# Patient Record
Sex: FEMALE | Race: OTHER | HISPANIC OR LATINO
[De-identification: names, ages, dates, MRNs, and addresses within clinical notes are randomized per-mention and may not be internally consistent; named-entity substitution may affect disease eponyms.]

---

## 2019-07-31 PROBLEM — Z00.00 ENCOUNTER FOR PREVENTIVE HEALTH EXAMINATION: Status: ACTIVE | Noted: 2019-07-31

## 2019-08-07 ENCOUNTER — APPOINTMENT (OUTPATIENT)
Age: 54
End: 2019-08-07
Payer: SELF-PAY

## 2019-08-07 VITALS — TEMPERATURE: 98.9 F | HEIGHT: 60 IN | BODY MASS INDEX: 47.32 KG/M2 | WEIGHT: 241 LBS

## 2019-08-07 VITALS — DIASTOLIC BLOOD PRESSURE: 87 MMHG | HEART RATE: 82 BPM | SYSTOLIC BLOOD PRESSURE: 118 MMHG | OXYGEN SATURATION: 92 %

## 2019-08-07 DIAGNOSIS — Z78.9 OTHER SPECIFIED HEALTH STATUS: ICD-10-CM

## 2019-08-07 DIAGNOSIS — E66.01 MORBID (SEVERE) OBESITY DUE TO EXCESS CALORIES: ICD-10-CM

## 2019-08-07 DIAGNOSIS — Z86.69 PERSONAL HISTORY OF OTHER DISEASES OF THE NERVOUS SYSTEM AND SENSE ORGANS: ICD-10-CM

## 2019-08-07 DIAGNOSIS — Z86.59 PERSONAL HISTORY OF OTHER MENTAL AND BEHAVIORAL DISORDERS: ICD-10-CM

## 2019-08-07 DIAGNOSIS — Z83.3 FAMILY HISTORY OF DIABETES MELLITUS: ICD-10-CM

## 2019-08-07 DIAGNOSIS — G47.30 SLEEP APNEA, UNSPECIFIED: ICD-10-CM

## 2019-08-07 PROCEDURE — 99203 OFFICE O/P NEW LOW 30 MIN: CPT

## 2019-08-07 NOTE — REVIEW OF SYSTEMS
[Patient Intake Form Reviewed] : Patient intake form was reviewed [Fatigue] : fatigue [Recent Change In Weight] : ~T recent weight change [SOB on Exertion] : shortness of breath during exertion [Negative] : Allergic/Immunologic [Fever] : no fever [Chills] : no chills [Night Sweats] : no night sweats [Vision Problems] : no vision problems [Dysphagia] : no dysphagia [Odynophagia] : no odynophagia [Chest Pain] : no chest pain [Palpitations] : no palpitations [Shortness Of Breath] : no shortness of breath [Wheezing] : no wheezing [Cough] : no cough [Vomiting] : no vomiting [Abdominal Pain] : no abdominal pain [Reflux/Heartburn] : no reflux/ heartburn [Hernia] : no hernia [Dysuria] : no dysuria [Incontinence] : no incontinence [Joint Pain] : no joint pain [Muscle Pain] : no muscle pain [Muscle Weakness] : no muscle weakness [Easy Bleeding] : no tendency for easy bleeding [Easy Bruising] : no tendency for easy bruising

## 2019-08-07 NOTE — HISTORY OF PRESENT ILLNESS
[de-identified] : Patient presents for bariatric surgery consultation. Patient has a long-standing history of severe obesity refractory to multiple prior attempts at weight loss including conservative treatments of diet and exercise. Patient has also developed increased risk or worsening obesity-related co-morbidities, including sleep apnea.  \par \par Patient has been obese since her 30's, when she had her first child.  The most weight that patient has been able to lose by any means is negligible and short-lived . \par The patient has tried several weight loss programs including self-directed and physician or dietician-supervised diets.  Patient has tried prescription weight loss medications and commercial diets.  Patient has limited capacity for exercise due to excess weight.  \par \par Patient feels that weight loss surgery is the only option at this point for effective and clinically meaningful weight loss.\par \par \par

## 2019-08-07 NOTE — ASSESSMENT
[FreeTextEntry1] : Clinical Impression\par Patient is a 54 year old female with a BMI of 46 which places patient in the morbidly obese category.  Patient also suffers from sleep apnea .  This has directly contributed to patient's  current medical conditions as well as, a decreased quality of life.  Patient has very little chance of successfully losing and maintaining a significant amount of weight with non-surgical management, and would benefit from surgical intervention.  Patient meets the criteria for weight loss surgery as defined in the NIH consensus statement, surgery is medically necessary. \par Given patient’s current BMI and obesity-related comorbidities, I feel the patient is a candidate for and would benefit from weight loss surgery, as all prior attempts by non-surgical means have been futile.\par

## 2019-08-07 NOTE — PHYSICAL EXAM
[Obese, well nourished, in no acute distress] : obese, well nourished, in no acute distress [Normal] : affect appropriate [de-identified] : Obese, protuberant. Soft, nontender, nondistended, no rebound or guarding.  Well-healed incisions from prior lap tito, no hernias appreciated.

## 2019-08-07 NOTE — CONSULT LETTER
[Dear  ___] : Dear  [unfilled], [Consult Letter:] : I had the pleasure of evaluating your patient, [unfilled]. [Please see my note below.] : Please see my note below. [Sincerely,] : Sincerely, [Consult Closing:] : Thank you very much for allowing me to participate in the care of this patient.  If you have any questions, please do not hesitate to contact me. [FreeTextEntry3] : Tushar

## 2019-08-21 ENCOUNTER — TRANSCRIPTION ENCOUNTER (OUTPATIENT)
Age: 54
End: 2019-08-21

## 2019-09-11 ENCOUNTER — OTHER (OUTPATIENT)
Age: 54
End: 2019-09-11

## 2019-12-13 ENCOUNTER — EMERGENCY (EMERGENCY)
Facility: HOSPITAL | Age: 54
LOS: 1 days | Discharge: HOME | End: 2019-12-13
Attending: EMERGENCY MEDICINE
Payer: COMMERCIAL

## 2019-12-13 VITALS
DIASTOLIC BLOOD PRESSURE: 80 MMHG | OXYGEN SATURATION: 98 % | HEART RATE: 78 BPM | RESPIRATION RATE: 17 BRPM | WEIGHT: 225.09 LBS | SYSTOLIC BLOOD PRESSURE: 140 MMHG | TEMPERATURE: 99 F

## 2019-12-13 VITALS
TEMPERATURE: 98 F | OXYGEN SATURATION: 98 % | HEART RATE: 66 BPM | DIASTOLIC BLOOD PRESSURE: 49 MMHG | RESPIRATION RATE: 17 BRPM | SYSTOLIC BLOOD PRESSURE: 123 MMHG

## 2019-12-13 DIAGNOSIS — Z98.890 OTHER SPECIFIED POSTPROCEDURAL STATES: Chronic | ICD-10-CM

## 2019-12-13 DIAGNOSIS — Z90.49 ACQUIRED ABSENCE OF OTHER SPECIFIED PARTS OF DIGESTIVE TRACT: Chronic | ICD-10-CM

## 2019-12-13 LAB
ANION GAP SERPL CALC-SCNC: 5 MMOL/L — SIGNIFICANT CHANGE UP (ref 5–17)
BUN SERPL-MCNC: 7 MG/DL — SIGNIFICANT CHANGE UP (ref 7–18)
CALCIUM SERPL-MCNC: 9 MG/DL — SIGNIFICANT CHANGE UP (ref 8.4–10.5)
CHLORIDE SERPL-SCNC: 111 MMOL/L — HIGH (ref 96–108)
CO2 SERPL-SCNC: 25 MMOL/L — SIGNIFICANT CHANGE UP (ref 22–31)
CREAT SERPL-MCNC: 0.61 MG/DL — SIGNIFICANT CHANGE UP (ref 0.5–1.3)
GLUCOSE SERPL-MCNC: 104 MG/DL — HIGH (ref 70–99)
HCT VFR BLD CALC: 37.3 % — SIGNIFICANT CHANGE UP (ref 34.5–45)
HGB BLD-MCNC: 12.6 G/DL — SIGNIFICANT CHANGE UP (ref 11.5–15.5)
MCHC RBC-ENTMCNC: 32.1 PG — SIGNIFICANT CHANGE UP (ref 27–34)
MCHC RBC-ENTMCNC: 33.8 GM/DL — SIGNIFICANT CHANGE UP (ref 32–36)
MCV RBC AUTO: 95.2 FL — SIGNIFICANT CHANGE UP (ref 80–100)
NRBC # BLD: 0 /100 WBCS — SIGNIFICANT CHANGE UP (ref 0–0)
PLATELET # BLD AUTO: 227 K/UL — SIGNIFICANT CHANGE UP (ref 150–400)
POTASSIUM SERPL-MCNC: 3.6 MMOL/L — SIGNIFICANT CHANGE UP (ref 3.5–5.3)
POTASSIUM SERPL-SCNC: 3.6 MMOL/L — SIGNIFICANT CHANGE UP (ref 3.5–5.3)
RBC # BLD: 3.92 M/UL — SIGNIFICANT CHANGE UP (ref 3.8–5.2)
RBC # FLD: 11.8 % — SIGNIFICANT CHANGE UP (ref 10.3–14.5)
SODIUM SERPL-SCNC: 141 MMOL/L — SIGNIFICANT CHANGE UP (ref 135–145)
WBC # BLD: 5.94 K/UL — SIGNIFICANT CHANGE UP (ref 3.8–10.5)
WBC # FLD AUTO: 5.94 K/UL — SIGNIFICANT CHANGE UP (ref 3.8–10.5)

## 2019-12-13 PROCEDURE — 72170 X-RAY EXAM OF PELVIS: CPT | Mod: 26

## 2019-12-13 PROCEDURE — 72192 CT PELVIS W/O DYE: CPT | Mod: 26

## 2019-12-13 PROCEDURE — 99285 EMERGENCY DEPT VISIT HI MDM: CPT

## 2019-12-13 PROCEDURE — 96374 THER/PROPH/DIAG INJ IV PUSH: CPT

## 2019-12-13 PROCEDURE — 80048 BASIC METABOLIC PNL TOTAL CA: CPT

## 2019-12-13 PROCEDURE — 72192 CT PELVIS W/O DYE: CPT

## 2019-12-13 PROCEDURE — 73502 X-RAY EXAM HIP UNI 2-3 VIEWS: CPT | Mod: 26,RT

## 2019-12-13 PROCEDURE — 85027 COMPLETE CBC AUTOMATED: CPT

## 2019-12-13 PROCEDURE — 99284 EMERGENCY DEPT VISIT MOD MDM: CPT | Mod: 25

## 2019-12-13 PROCEDURE — 72170 X-RAY EXAM OF PELVIS: CPT

## 2019-12-13 PROCEDURE — 72131 CT LUMBAR SPINE W/O DYE: CPT

## 2019-12-13 PROCEDURE — 96375 TX/PRO/DX INJ NEW DRUG ADDON: CPT

## 2019-12-13 PROCEDURE — 73502 X-RAY EXAM HIP UNI 2-3 VIEWS: CPT

## 2019-12-13 PROCEDURE — 36415 COLL VENOUS BLD VENIPUNCTURE: CPT

## 2019-12-13 PROCEDURE — 99053 MED SERV 10PM-8AM 24 HR FAC: CPT

## 2019-12-13 PROCEDURE — 72131 CT LUMBAR SPINE W/O DYE: CPT | Mod: 26

## 2019-12-13 RX ORDER — ACETAMINOPHEN 500 MG
650 TABLET ORAL ONCE
Refills: 0 | Status: COMPLETED | OUTPATIENT
Start: 2019-12-13 | End: 2019-12-13

## 2019-12-13 RX ORDER — KETOROLAC TROMETHAMINE 30 MG/ML
15 SYRINGE (ML) INJECTION ONCE
Refills: 0 | Status: DISCONTINUED | OUTPATIENT
Start: 2019-12-13 | End: 2019-12-13

## 2019-12-13 RX ORDER — MORPHINE SULFATE 50 MG/1
4 CAPSULE, EXTENDED RELEASE ORAL ONCE
Refills: 0 | Status: DISCONTINUED | OUTPATIENT
Start: 2019-12-13 | End: 2019-12-13

## 2019-12-13 RX ORDER — CYCLOBENZAPRINE HYDROCHLORIDE 10 MG/1
1 TABLET, FILM COATED ORAL
Qty: 15 | Refills: 0
Start: 2019-12-13 | End: 2019-12-17

## 2019-12-13 RX ORDER — DIAZEPAM 5 MG
2 TABLET ORAL ONCE
Refills: 0 | Status: DISCONTINUED | OUTPATIENT
Start: 2019-12-13 | End: 2019-12-13

## 2019-12-13 RX ADMIN — Medication 650 MILLIGRAM(S): at 09:57

## 2019-12-13 RX ADMIN — Medication 2 MILLIGRAM(S): at 08:09

## 2019-12-13 RX ADMIN — MORPHINE SULFATE 4 MILLIGRAM(S): 50 CAPSULE, EXTENDED RELEASE ORAL at 11:54

## 2019-12-13 RX ADMIN — Medication 15 MILLIGRAM(S): at 08:09

## 2019-12-13 RX ADMIN — Medication 650 MILLIGRAM(S): at 10:30

## 2019-12-13 RX ADMIN — Medication 15 MILLIGRAM(S): at 10:30

## 2019-12-13 NOTE — ED PROVIDER NOTE - CLINICAL SUMMARY MEDICAL DECISION MAKING FREE TEXT BOX
53 yo F with right reproducible right gluteal pain. Likely msk. Will provided analgesia and reassess.

## 2019-12-13 NOTE — ED PROVIDER NOTE - NS ED ROS FT
ROS  GENERAL: no fevers, no chills  EYES: no visual changes, no blurry vision    ENT: no epistaxis,  no throat pain  CHEST: no pain with breathing,  no hemoptysis   CARDIAC: no chest pain, no upper back pain   GI: no abdominal pain, no hematemesis, no bright red blood per rectum   : no dysuria, no hematuria   MSK: no arm pain, no back pain, right leg and gluteal pain   SKIN: no rash   NEURO: no headache, no neck pain   HEME: no easy bruising, no easy bleeding

## 2019-12-13 NOTE — ED PROVIDER NOTE - PHYSICAL EXAMINATION
GEN:   comfortable, in no apparent distress, AOx3, obese   EYES:   PERRL, extra-occular movements intact  HEENT:   airway patent, moist mucosal membranes, uvula midline  CV:   regular rate and rhythm, normal S1/S2, no murmur  RESP:   clear to auscultation bilaterally, non-labored, speaking in full sentences  ABD:   soft, non tender, no guarding  :   no cva tenderness  MSK:   5/5 strength, moving all extremity, reproducible right gluteal pain, no erythema, no ecchymosis, no lower back pain.   SKIN:   dry, intact, no rash  NEURO:   AOx3, no focal weakness or loss of sensation, gait normal, GCS 15

## 2019-12-13 NOTE — ED PROVIDER NOTE - OBJECTIVE STATEMENT
53 yo F with PMH of kidney stones, obesity presents with right sided gluteal pain. 53 yo F with PMH of kidney stones, obesity presents with right sided gluteal pain. States that pain is sharp and achy 53 yo F with PMH of kidney stones, obesity presents with right sided gluteal pain. States that pain is sharp and achy today and traveling down right leg at time. Reports that yesterday she was walking around without no issues at the grocery store. States gradual increase pain along musculature. Denies trauma, falls,  fevers, nausea, emesis, chest pain, shortness of breath, abdominal pain, dysuria, hematuria, diarrhea, constipation, or any other acute complaints. Is a housewife.

## 2019-12-13 NOTE — ED PROVIDER NOTE - NSFOLLOWUPINSTRUCTIONS_ED_ALL_ED_FT
You were seen today for your low back pain and butt pain. You have a herniated disc and will need to follow up with the orthopedic doctor. You are being prescribed muscle relaxers and anti-inflammatories. Please return to the Emergency Department for worsening signs or symptoms.      Usted fue visto hoy por fleming dolor lumbar y dolor en el trasero. Tiene neha hernia de disco y deberá hacer un seguimiento con el médico ortopédico. Le recetan relajantes musculares y antiinflamatorios. Regrese al Departamento de emergencias para empeorar los signos o síntomas.    Dolor de espalda    LO QUE NECESITA SABER:    El dolor de espalda es común. Puede ser causado por neha gran cantidad de afecciones, gustabo la artritis o por el deterioro de los discos de la columna vertebral. El riesgo del dolor de espalda aumenta al sufrir lesiones, por falta de actividad física, o inclinarse hacia adelante o girar de un lado a otro de forma repetitiva. Usted puede estar adolorido o sentir rigidez en jarod o ambos lados de la espalda. El dolor se puede propagar a anu glúteos o muslos.    INSTRUCCIONES SOBRE EL [facility] HOSPITALARIA:    Regrese a la norberto de emergencias si:    Usted tiene dolor, entumecimiento o debilidad en neha o en ambas piernas.      El dolor se vuelve tan intenso que lo incapacita para caminar.      Usted no puede controlar fleming orina ni anu deposiciones intestinales.      Usted tiene dolor de espalda intenso con dolor en el pecho.      Usted tiene dolor de espalda intenso, náuseas y vómito.      Usted tiene un dolor de espalda intenso que se propaga a un costado o al área genital.    Comuníquese con fleming médico si:    Usted tiene dolor de espalda que no mejora con el reposo, ni con el medicamento para el dolor.      Tiene fiebre.      Usted tiene un dolor que empeora cuando está acostado boca arriba o al descansar.      Usted tiene dolor que empeora cuando tose o estornuda.      Usted pierde peso sin proponérselo.      Usted tiene preguntas o inquietudes acerca de fleming condición o cuidado.    Medicamentos:    Los NANCY,ayudan a disminuir la inflamación y el dolor. Diomedes medicamento está disponible con o sin neha receta médica. Los NANCY pueden causar sangrado estomacal o problemas renales en ciertas personas. Si usted oleksandr un medicamento anticoagulante, siempre pregúntele a fleming médico si los NANCY son seguros para usted. Siempre sheree la etiqueta de diomedes medicamento y siga las instrucciones.      Acetaminofénalivia el dolor y baja la fiebre. Está disponible sin receta médica. Pregunte la cantidad y la frecuencia con que debe tomarlos. Siga las indicaciones. Sheree las etiquetas de todos los demás medicamentos que esté usando para saber si también contienen acetaminofén, o pregunte a fleming médico o farmacéutico. El acetaminofén puede causar daño en el hígado cuando no se oleksandr de forma correcta. No use más de 4 gramos (4000 miligramos) en total de acetaminofeno en un día.      Relajantes muscularesayudan a disminuir los espasmos musculares y el dolor de espalda.      Puede administrarsepodrían administrarse. Pregunte al médico cómo debe fredrick diomedes medicamento de forma harrington. Algunos medicamentos recetados para el dolor contienen acetaminofén. No tome otros medicamentos que contengan acetaminofén sin consultarlo con fleming médico. Demasiado acetaminofeno puede causar daño al hígado. Los medicamentos recetados para el dolor podrían causar estreñimiento. Pregunte a fleming médico gustabo prevenir o tratar estreñimiento.      Stoddard anu medicamentos gustabo se le haya indicado.Consulte con fleming médico si usted liseth que fleming medicamento no le está ayudando o si presenta efectos secundarios. Infórmele si es alérgico a cualquier medicamento. Mantenga neha lista actualizada de los medicamentos, las vitaminas y los productos herbales que oleksandr. Incluya los siguientes datos de los medicamentos: cantidad, frecuencia y motivo de administración. Traiga con usted la lista o los envases de las píldoras a anu citas de seguimiento. Lleve la lista de los medicamentos con usted en randall de neha emergencia.    La forma de controlar fleming dolor de espalda:    Aplique hieloen la espalda de 15 a 20 minutos cada hora o gustabo se le indique. Use neha compresa de hielo o ponga hielo triturado en neha bolsa de plástico. Cúbralo con neha toalla antes de aplicarlo sobre fleming piel. El hielo disminuye el dolor y ayuda a evitar el daño en los tejidos.      La aplicación de caloren la espalda de 20 a 30 minutos cada 2 horas por los días que le indiquen. El calor ayuda a disminuir el dolor y los espasmos musculares.      Manténgase activolo más que pueda sin causar más dolor. El reposo en cama puede empeorar fleming dolor de espalda. Evite levantar objetos hasta que ya no tenga dolor.      Vaya a terapia física según indicaciones.Un fisioterapeuta puede enseñarle ejercicios que contribuyan a mejorar fleming movimiento y fuerza, y a aliviar el dolor.    Acuda en 2 semanas a fleming alli de control con fleming médico, o según le indicaron:Anote anu preguntas para que se acuerde de hacerlas jennifer anu visitas.

## 2019-12-13 NOTE — ED PROVIDER NOTE - CARE PLAN
Principal Discharge DX:	Low back pain Principal Discharge DX:	Low back pain  Secondary Diagnosis:	Herniated nucleus pulposus, lumbar

## 2019-12-13 NOTE — ED PROVIDER NOTE - PATIENT PORTAL LINK FT
You can access the FollowMyHealth Patient Portal offered by [facility] by registering at the following website: [email or web address]. By joining [facility]’s FollowMyHealth portal, you will also be able to view your health information using other applications (apps) compatible with our system.

## 2019-12-13 NOTE — ED PROVIDER NOTE - PROGRESS NOTE DETAILS
XR without acute fracture. Patient reports persistent pain after analgesia. Will obtain CT, labs, and provide morphine and reassess. CT remarkable for herniated disc but without acute fracture. To follow up with orthopedics/spine and referral made. Improved after analgesia and ambulatory. Rx provided for naproxen and flexeril. Nontoxic and medically stable for discharge. Return precautions provided and patient understands to return to the ED for worsening signs and symptoms. Instructed to follow up with primary care physician/specialist and agreeable. Patient's questions answered and given copies of lab results.

## 2019-12-13 NOTE — ED ADULT NURSE NOTE - NSIMPLEMENTINTERV_GEN_ALL_ED
Implemented All Universal Safety Interventions:  [place] to call system. Call bell, personal items and telephone within reach. Instruct patient to call for assistance. Room bathroom lighting operational. Non-slip footwear when patient is off stretcher. Physically safe environment: no spills, clutter or unnecessary equipment. Stretcher in lowest position, wheels locked, appropriate side rails in place.

## 2019-12-13 NOTE — ED PROVIDER NOTE - NSFOLLOWUPCLINICS_GEN_ALL_ED_FT
[facility]  [facility]  [address] - [address] [place] [address]  Phone: [phone]  Fax: [phone]  Follow Up Time:     [facility]  [facility]  [address] [place] [address]  Phone: [phone]  Fax: [phone]  Follow Up Time:
